# Patient Record
Sex: FEMALE | Race: BLACK OR AFRICAN AMERICAN | NOT HISPANIC OR LATINO | Employment: PART TIME | URBAN - METROPOLITAN AREA
[De-identification: names, ages, dates, MRNs, and addresses within clinical notes are randomized per-mention and may not be internally consistent; named-entity substitution may affect disease eponyms.]

---

## 2017-02-08 ENCOUNTER — TELEPHONE (OUTPATIENT)
Dept: OBSTETRICS AND GYNECOLOGY | Facility: CLINIC | Age: 52
End: 2017-02-08

## 2017-02-08 DIAGNOSIS — R68.82 DECREASED LIBIDO: ICD-10-CM

## 2017-02-08 DIAGNOSIS — N95.1 SYMPTOMATIC MENOPAUSAL OR FEMALE CLIMACTERIC STATES: Primary | ICD-10-CM

## 2017-02-08 RX ORDER — PROGESTERONE 100 MG/1
100 CAPSULE ORAL DAILY
Qty: 30 CAPSULE | Refills: 5 | Status: SHIPPED | OUTPATIENT
Start: 2017-02-08 | End: 2017-05-08 | Stop reason: SDUPTHER

## 2017-02-08 RX ORDER — ESTERIFIED ESTROGEN AND METHYLTESTOSTERONE 1.25; 2.5 MG/1; MG/1
1 TABLET ORAL DAILY
Qty: 30 TABLET | Refills: 5 | Status: SHIPPED | OUTPATIENT
Start: 2017-02-08 | End: 2017-09-18 | Stop reason: SDUPTHER

## 2017-02-08 NOTE — TELEPHONE ENCOUNTER
rx sent for covaryx and prometrium (her pharmacy does not take electronic schedule 3 rx); will have to fax rx  Her ww exam is due after 07/20/2017

## 2017-02-08 NOTE — TELEPHONE ENCOUNTER
----- Message from Amelia Martinez sent at 2/8/2017 10:57 AM CST -----  Contact: Patient   Refill request for Rx  Covaryx, Prometrium      [facility]  [phone]    Thanks  Td

## 2017-05-08 ENCOUNTER — TELEPHONE (OUTPATIENT)
Dept: OBSTETRICS AND GYNECOLOGY | Facility: CLINIC | Age: 52
End: 2017-05-08

## 2017-05-08 DIAGNOSIS — N95.1 SYMPTOMATIC MENOPAUSAL OR FEMALE CLIMACTERIC STATES: ICD-10-CM

## 2017-05-08 RX ORDER — PROGESTERONE 100 MG/1
100 CAPSULE ORAL 2 TIMES DAILY
Qty: 60 CAPSULE | Refills: 11 | Status: SHIPPED | OUTPATIENT
Start: 2017-05-08 | End: 2017-08-12 | Stop reason: SDUPTHER

## 2017-05-08 NOTE — TELEPHONE ENCOUNTER
----- Message from Princess Murphy MA sent at 5/5/2017 10:06 AM CDT -----  Pt states you gave her some promethium 1 tab bid to slow down cycles, but she noticed the new directions changed to one daily. Since the one daily she states her cycles starting to creep back, do you want to keep her on one a day or change her to two daily to slow down cycle?

## 2017-05-09 ENCOUNTER — TELEPHONE (OUTPATIENT)
Dept: OBSTETRICS AND GYNECOLOGY | Facility: CLINIC | Age: 52
End: 2017-05-09

## 2017-05-09 NOTE — TELEPHONE ENCOUNTER
----- Message from Tiera Blount sent at 5/9/2017 11:30 AM CDT -----  Contact: Pt  Pt is requesting to speak with the nurse./ States she needs to know if more progesterone will be called in for her./ Pt can be reached at [phone] (zgyy)

## 2017-07-13 ENCOUNTER — TELEPHONE (OUTPATIENT)
Dept: OBSTETRICS AND GYNECOLOGY | Facility: CLINIC | Age: 52
End: 2017-07-13

## 2017-07-13 NOTE — TELEPHONE ENCOUNTER
----- Message from Heidi Castellanos sent at 7/13/2017 11:03 AM CDT -----  Contact: [phone]  .  [id number] - [place], [place] - [address] [place] [address]  Phone: [phone] Fax: [phone]  Pt would like to consult with nurse about Cobaryx 1.25mg/2.5 Generic for Estratest. Need refill. Please call back at [phone]. Thx. [facility] - [place], [place] - [address]  [address] [address]  Phone: [phone] Fax: [phone]

## 2017-08-11 ENCOUNTER — TELEPHONE (OUTPATIENT)
Dept: OBSTETRICS AND GYNECOLOGY | Facility: CLINIC | Age: 52
End: 2017-08-11

## 2017-08-11 DIAGNOSIS — N95.1 SYMPTOMATIC MENOPAUSAL OR FEMALE CLIMACTERIC STATES: Primary | ICD-10-CM

## 2017-08-11 NOTE — TELEPHONE ENCOUNTER
----- Message from Paula Blount sent at 8/11/2017  1:35 PM CDT -----  Patient states she need to speak to nurse regarding her Rx for Cobaryx. Please adv/call [phone].//thanks. cw

## 2017-08-11 NOTE — TELEPHONE ENCOUNTER
----- Message from Shaunna Iniguez sent at 8/11/2017  1:57 PM CDT -----  Contact: pt   Pt needs a refill on covaryx generic  for estratest,,, [facility],, please call pt when prescri[tion done at [phone]

## 2017-08-12 NOTE — TELEPHONE ENCOUNTER
Please advise rx sent for covaryx as well as prometrium    Needs to take one of each!--   rx cannot be eprescribed ([facility] will not accept); will have to fax over on monday

## 2017-08-14 RX ORDER — PROGESTERONE 100 MG/1
100 CAPSULE ORAL 2 TIMES DAILY
Qty: 60 CAPSULE | Refills: 3 | Status: SHIPPED | OUTPATIENT
Start: 2017-08-14 | End: 2017-09-18 | Stop reason: SDUPTHER

## 2017-08-14 RX ORDER — ESTERIFIED ESTROGEN AND METHYLTESTOSTERONE .625; 1.25 MG/1; MG/1
1 TABLET ORAL DAILY
Qty: 30 TABLET | Refills: 3 | Status: SHIPPED | OUTPATIENT
Start: 2017-08-14 | End: 2017-09-13

## 2017-09-18 ENCOUNTER — OFFICE VISIT (OUTPATIENT)
Dept: OBSTETRICS AND GYNECOLOGY | Facility: CLINIC | Age: 52
End: 2017-09-18
Payer: COMMERCIAL

## 2017-09-18 VITALS — BODY MASS INDEX: 30.76 KG/M2 | HEIGHT: 67 IN | WEIGHT: 196 LBS

## 2017-09-18 DIAGNOSIS — N95.1 SYMPTOMATIC MENOPAUSAL OR FEMALE CLIMACTERIC STATES: ICD-10-CM

## 2017-09-18 DIAGNOSIS — Z01.419 ENCOUNTER FOR GYNECOLOGICAL EXAMINATION (GENERAL) (ROUTINE) WITHOUT ABNORMAL FINDINGS: Primary | ICD-10-CM

## 2017-09-18 DIAGNOSIS — R68.82 DECREASED LIBIDO: ICD-10-CM

## 2017-09-18 DIAGNOSIS — Z12.31 SCREENING MAMMOGRAM, ENCOUNTER FOR: ICD-10-CM

## 2017-09-18 DIAGNOSIS — Z12.4 SCREENING FOR CERVICAL CANCER: ICD-10-CM

## 2017-09-18 PROCEDURE — 88175 CYTOPATH C/V AUTO FLUID REDO: CPT

## 2017-09-18 PROCEDURE — 99396 PREV VISIT EST AGE 40-64: CPT | Mod: S$GLB,,, | Performed by: OBSTETRICS & GYNECOLOGY

## 2017-09-18 PROCEDURE — 99999 PR PBB SHADOW E&M-EST. PATIENT-LVL III: CPT | Mod: PBBFAC,,, | Performed by: OBSTETRICS & GYNECOLOGY

## 2017-09-18 RX ORDER — ESTERIFIED ESTROGEN AND METHYLTESTOSTERONE 1.25; 2.5 MG/1; MG/1
1 TABLET ORAL DAILY
Qty: 30 TABLET | Refills: 5 | Status: SHIPPED | OUTPATIENT
Start: 2017-09-18 | End: 2018-01-11 | Stop reason: SDUPTHER

## 2017-09-18 RX ORDER — AMLODIPINE BESYLATE 10 MG/1
TABLET ORAL
COMMUNITY
Start: 2017-07-22 | End: 2018-11-08

## 2017-09-18 RX ORDER — DEXLANSOPRAZOLE 60 MG/1
CAPSULE, DELAYED RELEASE ORAL
COMMUNITY
Start: 2017-07-06 | End: 2018-11-08

## 2017-09-18 RX ORDER — ESTERIFIED ESTROGEN AND METHYLTESTOSTERONE 1.25; 2.5 MG/1; MG/1
1 TABLET ORAL DAILY
Qty: 30 TABLET | Refills: 5 | Status: SHIPPED | OUTPATIENT
Start: 2017-09-18 | End: 2017-09-18 | Stop reason: SDUPTHER

## 2017-09-18 RX ORDER — PROGESTERONE 100 MG/1
100 CAPSULE ORAL DAILY
Qty: 30 CAPSULE | Refills: 11 | Status: SHIPPED | OUTPATIENT
Start: 2017-09-18 | End: 2017-12-19 | Stop reason: SDUPTHER

## 2017-09-18 NOTE — PROGRESS NOTES
Subjective:       Patient ID: Tanisha Cunningham is a 52 y.o. female.    Chief Complaint:  Well Woman      History of Present Illness  HPI  Annual Exam-Postmenopausal  Patient presents for annual exam. The patient has no complaints today. The patient is sexually active--denies vaginal dryness; pelvic pain;  GYN screening history: last pap: approximate date  and was normal and last mammogram: approximate date  and was normal. The patient is taking hormone replacement therapy. Patient denies post-menopausal vaginal bleeding. The patient wears seatbelts: yes -- The patient participates in regular exercise: yes----walking 30 min 2 times/wk. Has the patient ever been transfused or tattooed?: no. The patient reports that there is not domestic violence in her life.      Reports no leak of urine; but has occasional urgency especially with exercise        GYN & OB HistoryPatient's last menstrual period was 2015 (approximate).   Date of Last Pap: No result found    OB History    Para Term  AB Living   2 2       22   SAB TAB Ectopic Multiple Live Births                  # Outcome Date GA Lbr Nakul/2nd Weight Sex Delivery Anes PTL Lv   2 Para            1 Para                   Review of Systems  Review of Systems   Constitutional: Negative for activity change, appetite change, chills, diaphoresis, fatigue, fever and unexpected weight change.   HENT: Negative for mouth sores and tinnitus.    Eyes: Negative for discharge and visual disturbance.   Respiratory: Negative for cough, shortness of breath and wheezing.    Cardiovascular: Negative for chest pain, palpitations and leg swelling.   Gastrointestinal: Negative for abdominal pain, bloating, blood in stool, constipation, diarrhea, nausea and vomiting.   Endocrine: Negative for diabetes, hair loss, hot flashes, hyperthyroidism and hypothyroidism.   Genitourinary: Negative for decreased libido, dyspareunia, dysuria, flank pain, frequency, genital  sores, hematuria, menorrhagia, menstrual problem, pelvic pain, urgency, vaginal bleeding, vaginal discharge, vaginal pain, dysmenorrhea, urinary incontinence, postcoital bleeding, postmenopausal bleeding and vaginal odor.   Musculoskeletal: Negative for back pain and myalgias.   Skin:  Negative for rash, no acne and hair changes.   Neurological: Negative for seizures, syncope, numbness and headaches.   Hematological: Negative for adenopathy. Does not bruise/bleed easily.   Psychiatric/Behavioral: Negative for depression and sleep disturbance. The patient is not nervous/anxious.    Breast: Negative for breast mass, breast pain, nipple discharge and skin changes          Objective:    Physical Exam:   Constitutional: She appears well-developed.     Eyes: Conjunctivae and EOM are normal. Pupils are equal, round, and reactive to light.    Neck: Normal range of motion. Neck supple.     Pulmonary/Chest: Effort normal. Right breast exhibits no mass, no nipple discharge, no skin change and no tenderness. Left breast exhibits no mass, no nipple discharge, no skin change and no tenderness. Breasts are symmetrical.        Abdominal: Soft.     Genitourinary: Rectum normal, vagina normal and uterus normal. Pelvic exam was performed with patient supine. Cervix is normal. Right adnexum displays no mass and no tenderness. Left adnexum displays no mass and no tenderness. No erythema, bleeding, rectocele, cystocele or unspecified prolapse of vaginal walls in the vagina. No vaginal discharge found. Labial bartholins normal.       Uterus Size: 6 cm   Musculoskeletal: Normal range of motion.       Neurological: She is alert.    Skin: Skin is warm.    Psychiatric: She has a normal mood and affect.          Assessment:     Encounter Diagnoses   Name Primary?    Symptomatic menopausal or female climacteric states     Decreased libido     Screening for cervical cancer     Encounter for gynecological examination (general) (routine)  without abnormal findings Yes    Screening mammogram, encounter for                Plan:      Continue annual well woman exam.  Pap today  rx sent for estratest and prometrium  Pt to call if abnl bleeding  Prefers mammo in [place], [place]  Continue diet, exercise, weight loss

## 2017-12-19 DIAGNOSIS — N95.1 SYMPTOMATIC MENOPAUSAL OR FEMALE CLIMACTERIC STATES: ICD-10-CM

## 2017-12-20 RX ORDER — PROGESTERONE 100 MG/1
100 CAPSULE ORAL DAILY
Qty: 30 CAPSULE | Refills: 9 | Status: SHIPPED | OUTPATIENT
Start: 2017-12-20 | End: 2018-01-03 | Stop reason: SDUPTHER

## 2018-01-03 ENCOUNTER — TELEPHONE (OUTPATIENT)
Dept: OBSTETRICS AND GYNECOLOGY | Facility: CLINIC | Age: 53
End: 2018-01-03

## 2018-01-03 DIAGNOSIS — N95.1 SYMPTOMATIC MENOPAUSAL OR FEMALE CLIMACTERIC STATES: Primary | ICD-10-CM

## 2018-01-03 RX ORDER — PROGESTERONE 100 MG/1
100 CAPSULE ORAL 2 TIMES DAILY
Qty: 60 CAPSULE | Refills: 6 | Status: SHIPPED | OUTPATIENT
Start: 2018-01-03 | End: 2018-07-13 | Stop reason: SDUPTHER

## 2018-01-03 NOTE — TELEPHONE ENCOUNTER
Pt states she is supposed to be taking progesterone bid per Dr. Singer but she only has a dispense amount of 30 monthly, Pt states she needs a dispense change to 60 tablets. Message has been forwarded to provider. [place]

## 2018-01-03 NOTE — TELEPHONE ENCOUNTER
Spoke to the pt. and informed her that Dr. Singer re-sent a rx for bid instead of 1 qd. Pt. Acknowledged understanding. dulce Red

## 2018-01-03 NOTE — TELEPHONE ENCOUNTER
----- Message from Melanie Irving sent at 1/2/2018 11:51 AM CST -----  Mrs landon calling having problems with her prescription , please call her back [phone]

## 2018-01-11 ENCOUNTER — TELEPHONE (OUTPATIENT)
Dept: OBSTETRICS AND GYNECOLOGY | Facility: CLINIC | Age: 53
End: 2018-01-11

## 2018-01-11 DIAGNOSIS — N95.1 SYMPTOMATIC MENOPAUSAL OR FEMALE CLIMACTERIC STATES: ICD-10-CM

## 2018-01-11 DIAGNOSIS — R68.82 DECREASED LIBIDO: ICD-10-CM

## 2018-01-11 RX ORDER — ESTERIFIED ESTROGEN AND METHYLTESTOSTERONE 1.25; 2.5 MG/1; MG/1
1 TABLET ORAL DAILY
Qty: 30 TABLET | Refills: 5 | Status: SHIPPED | OUTPATIENT
Start: 2018-01-11 | End: 2018-07-05 | Stop reason: SDUPTHER

## 2018-01-11 NOTE — TELEPHONE ENCOUNTER
Left messages for the pt. to call back. dulce chambers    Ms. Cunningham called this afternoon to see if someone could call in her prescription for the estratest//covaryx to the [facility] in [place], [place] because the [facility] doesn't have it at this time. And she requested that a nurse call her as soon as possible.

## 2018-01-11 NOTE — TELEPHONE ENCOUNTER
Pt.'s pharmacy does not have her rx at [place], [facility] has the estratest/covaryx in stock. dulce Red    Mrs marcial   Calling back making sure that her med call in at [facility] in [place] [phone] , [facility] did not have the medication   Mrs landon can be re back at [phone]

## 2018-07-05 DIAGNOSIS — N95.1 SYMPTOMATIC MENOPAUSAL OR FEMALE CLIMACTERIC STATES: ICD-10-CM

## 2018-07-05 DIAGNOSIS — R68.82 DECREASED LIBIDO: ICD-10-CM

## 2018-07-08 RX ORDER — ESTERIFIED ESTROGEN AND METHYLTESTOSTERONE 1.25; 2.5 MG/1; MG/1
TABLET ORAL
Qty: 30 TABLET | Refills: 3 | Status: SHIPPED | OUTPATIENT
Start: 2018-07-08 | End: 2018-10-15 | Stop reason: SDUPTHER

## 2018-07-13 ENCOUNTER — TELEPHONE (OUTPATIENT)
Dept: OBSTETRICS AND GYNECOLOGY | Facility: CLINIC | Age: 53
End: 2018-07-13

## 2018-07-13 DIAGNOSIS — N95.1 SYMPTOMATIC MENOPAUSAL OR FEMALE CLIMACTERIC STATES: Primary | ICD-10-CM

## 2018-07-13 DIAGNOSIS — R68.82 DECREASED LIBIDO: ICD-10-CM

## 2018-07-13 RX ORDER — PROGESTERONE 100 MG/1
100 CAPSULE ORAL 2 TIMES DAILY
Qty: 60 CAPSULE | Refills: 3 | Status: SHIPPED | OUTPATIENT
Start: 2018-07-13 | End: 2018-10-15 | Stop reason: SDUPTHER

## 2018-07-13 NOTE — TELEPHONE ENCOUNTER
LM to inform Pt that her estrogen was processed at [facility] 7/8/18 with 3 refills.   Her annual appt was scheduled for 9/19 at 1:15 please inform Pt.     The prometrium has to be ordered to be sent to [facility]. Message has been forwarded to provider. [place]

## 2018-07-13 NOTE — TELEPHONE ENCOUNTER
----- Message from Senait Marcela sent at 7/12/2018  2:33 PM CDT -----  Pt at [phone]//states she is checking on the status of 2 refills//one refill goes to [facility] in [place], [place]//med is Estrogen(not sure of name) and the other goes to [facility] in [place], [place]//med is Progestrone//takes twice daily//quantity of 60 per month//please call//thanks/lh

## 2018-10-15 DIAGNOSIS — N95.1 SYMPTOMATIC MENOPAUSAL OR FEMALE CLIMACTERIC STATES: ICD-10-CM

## 2018-10-15 DIAGNOSIS — R68.82 DECREASED LIBIDO: ICD-10-CM

## 2018-10-15 NOTE — TELEPHONE ENCOUNTER
Pt called requesting a refill on her progesterone and estratest. Msg has been forwarded to Dr. Singer for approval. Pt has been notified. Pt verbalizes understanding. DS

## 2018-10-15 NOTE — TELEPHONE ENCOUNTER
----- Message from Hailee Hardyalfonsospenser sent at 10/15/2018 12:52 PM CDT -----  Contact: self [phone]  1. What is the name of the medication you are requesting? progesterone  2. What is the dose? 100mg  3. How do you take the medication? Orally, topically, etc? orally  4. How often do you take this medication? 2x daily  5. Do you need a 30 day or 90 day supply? 30 day  6. How many refills are you requesting? 1  7. What is your preferred pharmacy and location of the pharmacy?     [facility] [place] [address]  Phone: [phone] Fax: [phone]    8. Who can we contact with further questions? Pt [phone]      1. What is the name of the medication you are requesting? estogen/methyltest  2. What is the dose? 1.25/2.5mg  3. How do you take the medication? Orally, topically, etc? orally  4. How often do you take this medication? daily  5. Do you need a 30 day or 90 day supply? 30 day  6. How many refills are you requesting? 1  7. What is your preferred pharmacy and location of the pharmacy?     [facility] [place] [address]  Phone: [phone] Fax: [phone]    8. Who can we contact with further questions? Pt [phone]

## 2018-10-16 RX ORDER — ESTERIFIED ESTROGEN AND METHYLTESTOSTERONE 1.25; 2.5 MG/1; MG/1
1 TABLET ORAL DAILY
Qty: 30 TABLET | Refills: 3 | Status: SHIPPED | OUTPATIENT
Start: 2018-10-16 | End: 2018-11-08 | Stop reason: SDUPTHER

## 2018-10-16 RX ORDER — PROGESTERONE 100 MG/1
100 CAPSULE ORAL 2 TIMES DAILY
Qty: 60 CAPSULE | Refills: 3 | Status: SHIPPED | OUTPATIENT
Start: 2018-10-16 | End: 2018-11-08 | Stop reason: SDUPTHER

## 2018-11-05 ENCOUNTER — TELEPHONE (OUTPATIENT)
Dept: OBSTETRICS AND GYNECOLOGY | Facility: CLINIC | Age: 53
End: 2018-11-05

## 2018-11-05 NOTE — TELEPHONE ENCOUNTER
----- Message from Shen Stauffer sent at 11/5/2018 12:41 PM CST -----  Contact: Pt.   Pt is calling regarding requesting to have nurse call Pt. Pt states that she has questions and concerns and would like  nurse to pt. .[phone] (home)

## 2018-11-08 ENCOUNTER — TELEPHONE (OUTPATIENT)
Dept: OBSTETRICS AND GYNECOLOGY | Facility: CLINIC | Age: 53
End: 2018-11-08

## 2018-11-08 ENCOUNTER — OFFICE VISIT (OUTPATIENT)
Dept: OBSTETRICS AND GYNECOLOGY | Facility: CLINIC | Age: 53
End: 2018-11-08
Payer: COMMERCIAL

## 2018-11-08 VITALS
BODY MASS INDEX: 30.76 KG/M2 | WEIGHT: 196 LBS | DIASTOLIC BLOOD PRESSURE: 82 MMHG | HEIGHT: 67 IN | SYSTOLIC BLOOD PRESSURE: 138 MMHG

## 2018-11-08 DIAGNOSIS — I10 ESSENTIAL HYPERTENSION: ICD-10-CM

## 2018-11-08 DIAGNOSIS — Z12.4 SCREENING FOR CERVICAL CANCER: ICD-10-CM

## 2018-11-08 DIAGNOSIS — N95.0 POSTMENOPAUSAL BLEEDING: ICD-10-CM

## 2018-11-08 DIAGNOSIS — N95.1 SYMPTOMATIC MENOPAUSAL OR FEMALE CLIMACTERIC STATES: ICD-10-CM

## 2018-11-08 DIAGNOSIS — R68.82 DECREASED LIBIDO: ICD-10-CM

## 2018-11-08 DIAGNOSIS — Z12.31 SCREENING MAMMOGRAM, ENCOUNTER FOR: ICD-10-CM

## 2018-11-08 DIAGNOSIS — Z01.419 ENCOUNTER FOR GYNECOLOGICAL EXAMINATION (GENERAL) (ROUTINE) WITHOUT ABNORMAL FINDINGS: Primary | ICD-10-CM

## 2018-11-08 PROCEDURE — 99999 PR PBB SHADOW E&M-EST. PATIENT-LVL III: CPT | Mod: PBBFAC,,, | Performed by: OBSTETRICS & GYNECOLOGY

## 2018-11-08 PROCEDURE — 99396 PREV VISIT EST AGE 40-64: CPT | Mod: S$GLB,,, | Performed by: OBSTETRICS & GYNECOLOGY

## 2018-11-08 RX ORDER — PROGESTERONE 100 MG/1
100 CAPSULE ORAL 2 TIMES DAILY
Qty: 60 CAPSULE | Refills: 5 | Status: SHIPPED | OUTPATIENT
Start: 2018-11-08 | End: 2019-04-30 | Stop reason: SDUPTHER

## 2018-11-08 RX ORDER — SULINDAC 150 MG/1
TABLET ORAL
COMMUNITY
Start: 2018-08-16 | End: 2018-11-08

## 2018-11-08 RX ORDER — TIZANIDINE 4 MG/1
TABLET ORAL
COMMUNITY
Start: 2018-10-29

## 2018-11-08 RX ORDER — ESTERIFIED ESTROGEN AND METHYLTESTOSTERONE 1.25; 2.5 MG/1; MG/1
1 TABLET ORAL DAILY
Qty: 30 TABLET | Refills: 5 | Status: SHIPPED | OUTPATIENT
Start: 2018-11-08 | End: 2019-04-30 | Stop reason: SDUPTHER

## 2018-11-08 RX ORDER — PROGESTERONE 100 MG/1
100 CAPSULE ORAL DAILY
Qty: 30 CAPSULE | Refills: 5 | Status: SHIPPED | OUTPATIENT
Start: 2018-11-08 | End: 2018-11-08 | Stop reason: SDUPTHER

## 2018-11-08 RX ORDER — PREDNISONE 20 MG/1
TABLET ORAL
COMMUNITY
Start: 2018-08-16 | End: 2018-11-08

## 2018-11-08 NOTE — TELEPHONE ENCOUNTER
Attempted to call pt,    Pt states that she need to talk to the nurse before her appt today      Left voicemail for pt to call clinic back.

## 2018-11-08 NOTE — TELEPHONE ENCOUNTER
----- Message from Anna Marie Martinez sent at 11/8/2018  8:48 AM CST -----  Contact: pt   Pt states that she need to talk to the nurse before her appt today.    .[phone] (home)

## 2018-11-13 ENCOUNTER — PROCEDURE VISIT (OUTPATIENT)
Dept: OBSTETRICS AND GYNECOLOGY | Facility: CLINIC | Age: 53
End: 2018-11-13
Payer: COMMERCIAL

## 2018-11-13 DIAGNOSIS — N95.0 POSTMENOPAUSAL BLEEDING: ICD-10-CM

## 2018-11-13 PROCEDURE — 76830 TRANSVAGINAL US NON-OB: CPT | Mod: S$GLB,,, | Performed by: OBSTETRICS & GYNECOLOGY

## 2018-11-14 ENCOUNTER — TELEPHONE (OUTPATIENT)
Dept: OBSTETRICS AND GYNECOLOGY | Facility: CLINIC | Age: 53
End: 2018-11-14

## 2018-11-14 NOTE — TELEPHONE ENCOUNTER
Returned call to patient.  She wants to know if she can be prescribed medication to stop the vaginal blood flow.  She says that she is having a light to medium flow daily with more during daytime hours. Takes Advil to relieve pain, per patient.  She confirmed her EMB Procedure scheduled on 12/06/18, and says that if stopping the bleeding will interfere with the procedure she is ok with the bleeding for now, per patient.

## 2018-11-14 NOTE — TELEPHONE ENCOUNTER
Please advise lining is thickened; needs emb  Please remind pt to premedicate with nsaid prior to procedure    Need evaluation of lining before starting medication to help stop bleeding

## 2018-11-14 NOTE — TELEPHONE ENCOUNTER
Pt has been made aware of ultrasound results and has been scheduled for an EMB; Pt has been made aware of the purpose of an EMB and to premedicate before coming in. Pt verbalizes understanding. DS

## 2018-11-14 NOTE — TELEPHONE ENCOUNTER
----- Message from Elle Singer MD sent at 11/13/2018  6:08 PM CST -----  Please advise the endometrial lining is thickened (8 mm); should be <4 mm; please keily emb; please remind pt to premedicate with nsaid prior to procedure

## 2018-11-14 NOTE — TELEPHONE ENCOUNTER
"Attempted to contact patient, no answer but LVM to return call to [phone]. Want to give patient Dr. Singer' response to her question, if the provider recommends medication to stop bleeding prior to the EMB Procedure.     "Please advise lining is thickened; needs emb  Please remind pt to premedicate with nsaid prior to procedure     Need evaluation of lining before starting medication to help stop bleeding," as per Dr. Singer  "

## 2018-12-06 ENCOUNTER — PROCEDURE VISIT (OUTPATIENT)
Dept: OBSTETRICS AND GYNECOLOGY | Facility: CLINIC | Age: 53
End: 2018-12-06
Payer: COMMERCIAL

## 2018-12-06 VITALS — DIASTOLIC BLOOD PRESSURE: 84 MMHG | BODY MASS INDEX: 30.7 KG/M2 | SYSTOLIC BLOOD PRESSURE: 136 MMHG | HEIGHT: 67 IN

## 2018-12-06 DIAGNOSIS — N95.0 POSTMENOPAUSAL BLEEDING: Primary | ICD-10-CM

## 2018-12-06 DIAGNOSIS — Z01.818 PRE-OP TESTING: ICD-10-CM

## 2018-12-06 PROCEDURE — 88305 TISSUE EXAM BY PATHOLOGIST: CPT | Performed by: PATHOLOGY

## 2018-12-06 PROCEDURE — 88305 TISSUE EXAM BY PATHOLOGIST: CPT | Mod: 26,,, | Performed by: PATHOLOGY

## 2018-12-06 NOTE — PROCEDURES
Procedures     CC: ENDOMETRIAL BIOPSPY    Tanisha Cunningham is a 53 y.o. female  presents for an endometrial biopsy secondary to post menopausal bleeding and thickened lining on sono .  UPT is declined.      PRE-ENDOMETRIAL BIOPSY COUNSELING:    The patient was informed of the risk of bleeding, infection, uterine perforation and pain and that the test will rule-out endometrial cancer with accuracy greater than 95%.  She was counseled on the alternatives to endometrial biopsy and agrees to proceed.      TIME OUT PERFORMED.    The cervix was visualized with a speculum.  A single tooth tenaculum was placed on the anterior lip prior to the biopsy.      A sterile endometrial pipelle was passed without difficulty to a depth of 6 cm.    Endometrial tissue was obtained.      The specimen was placed in formalyn and sent to Pathology of histology evaluation.    The patient tolerated the procedure well.      ASSESSMENT AND PLAN  1. Postmenopausal bleeding  Tissue Specimen To Pathology, Obstetrics/Gynecology   2. Pre-op testing  POCT urine pregnancy       POST ENDOMETRIAL BIOPSY COUNSELING:  Manage post biopsy cramping with NSAIDs or Tylenol.  Expect spotting or light bleeding for a few days.  Report bleeding heavier than a period, fever > 101.0 F, worsening pain or a foul smelling vaginal discharge.      Counseling lasted approximately 15 minutes and all her questions were answered.      FOLLOW-UP:  Pending biopsy

## 2018-12-13 ENCOUNTER — TELEPHONE (OUTPATIENT)
Dept: OBSTETRICS AND GYNECOLOGY | Facility: CLINIC | Age: 53
End: 2018-12-13

## 2018-12-13 NOTE — TELEPHONE ENCOUNTER
Spoke to the pt. And advised her that the tissue was insufficient for diagnosis; please let us know if any further bleeding. Pt. Acknowledged understanding. dulce Red

## 2019-04-30 ENCOUNTER — TELEPHONE (OUTPATIENT)
Dept: OBSTETRICS AND GYNECOLOGY | Facility: CLINIC | Age: 54
End: 2019-04-30

## 2019-04-30 DIAGNOSIS — N95.1 SYMPTOMATIC MENOPAUSAL OR FEMALE CLIMACTERIC STATES: ICD-10-CM

## 2019-04-30 DIAGNOSIS — R68.82 DECREASED LIBIDO: ICD-10-CM

## 2019-04-30 RX ORDER — PROGESTERONE 100 MG/1
100 CAPSULE ORAL 2 TIMES DAILY
Qty: 60 CAPSULE | Refills: 5 | Status: SHIPPED | OUTPATIENT
Start: 2019-04-30 | End: 2019-10-23 | Stop reason: SDUPTHER

## 2019-04-30 RX ORDER — ESTERIFIED ESTROGEN AND METHYLTESTOSTERONE 1.25; 2.5 MG/1; MG/1
TABLET ORAL
Qty: 30 TABLET | Refills: 5 | Status: SHIPPED | OUTPATIENT
Start: 2019-04-30 | End: 2019-10-23 | Stop reason: SDUPTHER

## 2019-04-30 NOTE — TELEPHONE ENCOUNTER
Spoke to the pt. And she stated she would call the pharmacist to get the  strength and call us back. dulce Red    If she wants the compound cream; she will have to get tested with a  (medical pharmacy); and get them to recommend a dose.     There is a plant based  estrogen and progesterone pill     She would not be able to be on testosterone     How does she wish to proceed?

## 2019-04-30 NOTE — TELEPHONE ENCOUNTER
Patient advised that hrt vs compound will have no different effect in her body    Advised pt to stop hrt if she does not want to use hrt

## 2019-04-30 NOTE — TELEPHONE ENCOUNTER
Please advise. dulce Red    Spoke to the pt. and advised she would like her estrogen and progesterone and she would like to get the plant compound instead of the pills. Please call pt at [phone]. Thank you

## 2019-04-30 NOTE — TELEPHONE ENCOUNTER
Please advise. dulce Red     Spoke to the pt. and advised she would like her estrogen and progesterone and she would like to get the plant compound instead of the pills. Please call pt at [phone]. Thank you

## 2019-04-30 NOTE — TELEPHONE ENCOUNTER
If she wants the compound cream; she will have to get tested with a  (medical pharmacy); and get them to recommend a dose.    There is a plant based  estrogen and progesterone pill    She would not be able to be on testosterone    How does she wish to proceed?

## 2019-10-22 ENCOUNTER — TELEPHONE (OUTPATIENT)
Dept: OBSTETRICS AND GYNECOLOGY | Facility: CLINIC | Age: 54
End: 2019-10-22

## 2019-10-22 DIAGNOSIS — N95.1 SYMPTOMATIC MENOPAUSAL OR FEMALE CLIMACTERIC STATES: ICD-10-CM

## 2019-10-22 DIAGNOSIS — R68.82 DECREASED LIBIDO: ICD-10-CM

## 2019-10-22 NOTE — TELEPHONE ENCOUNTER
----- Message from Amelia Martinez sent at 10/22/2019  8:18 AM CDT -----  Contact: Patient   Type:  RX Refill Request    Who Called: Patient   Refill or New Rx: Refill  RX Name and Strength: Estrogen  How is the patient currently taking it? (ex. 1XDay): n/a  Is this a 30 day or 90 day RX: 30  Preferred Pharmacy with phone number:   [facility] [place], [place] - [address] [place], [place] [address]  Phone: [phone] Fax: [phone]  Local or Mail Order: Local  Ordering Provider: Dr. Elle Singer  Would the patient rather a call back or a response via [facility]? Call back  Best Call Back Number: Please call her at [phone]  Additional Information: n/a    Type:  RX Refill Request    Who Called: Patient  Refill or New Rx: Refill  RX Name and Strength: Progesterone  How is the patient currently taking it? (ex. 1XDay): n/a  Is this a 30 day or 90 day RX: 30  Preferred Pharmacy with phone number:   [facility], [place] - [address] [address]  Phone: [phone] Fax: [phone]  Local or Mail Order: Local  Ordering Provider: Dr. Elle Singer  Would the patient rather a call back or a response via [facility]? Call back   Best Call Back Number: Please call her at [phone]  Additional Information: n/a

## 2019-10-22 NOTE — TELEPHONE ENCOUNTER
Spoke with patient, patient requesting refill on Estrogen and Progesterone.  Patient is due for an annual after 11/08/19.  Please advise.

## 2019-10-22 NOTE — TELEPHONE ENCOUNTER
Patient stated she is only taking 2 medications, her progesterone and her estrogen.  I tried to see if she wanted estratest or estrace, and she insisted it was whatever Dr. Singer has prescribed.

## 2019-10-24 ENCOUNTER — TELEPHONE (OUTPATIENT)
Dept: OBSTETRICS AND GYNECOLOGY | Facility: CLINIC | Age: 54
End: 2019-10-24

## 2019-10-24 RX ORDER — PROGESTERONE 100 MG/1
100 CAPSULE ORAL 2 TIMES DAILY
Qty: 60 CAPSULE | Refills: 2 | OUTPATIENT
Start: 2019-10-24 | End: 2020-10-23

## 2019-10-24 RX ORDER — ESTERIFIED ESTROGEN AND METHYLTESTOSTERONE 1.25; 2.5 MG/1; MG/1
1 TABLET ORAL DAILY
Qty: 30 TABLET | Refills: 2 | Status: SHIPPED | OUTPATIENT
Start: 2019-10-24 | End: 2020-03-22

## 2019-10-24 NOTE — TELEPHONE ENCOUNTER
Please call pharmacy    Unable to print and pharmacy does not except electronic rx     Estratest rx (estratest 1.5-2.5) 1 po daily, #30, RF2    prometrium 100mg 1 po bid #60 RF 2

## 2019-10-24 NOTE — TELEPHONE ENCOUNTER
Per orders, called [facility]. slee,lpn    Estratest rx (estratest 1.5-2.5) 1 po daily, #30, RF2     prometrium 100mg 1 po bid #60 RF 2